# Patient Record
Sex: FEMALE | Race: WHITE | NOT HISPANIC OR LATINO | Employment: FULL TIME | URBAN - METROPOLITAN AREA
[De-identification: names, ages, dates, MRNs, and addresses within clinical notes are randomized per-mention and may not be internally consistent; named-entity substitution may affect disease eponyms.]

---

## 2024-09-05 DIAGNOSIS — R10.11 RUQ ABDOMINAL PAIN: ICD-10-CM

## 2024-09-05 DIAGNOSIS — B35.4 RINGWORM OF BODY: ICD-10-CM

## 2024-09-05 NOTE — TELEPHONE ENCOUNTER
Patient called asking for a refill of their lotrisone cream and zofran. Pt was recently seen by Dr. Raphael on 8/28/24.    Received request via: Patient    Was the patient seen in the last year in this department? Yes    Does the patient have an active prescription (recently filled or refills available) for medication(s) requested? No    Pharmacy Name: [facility]    Does the patient have snf Plus and need 100-day supply? (This applies to ALL medications) Patient does not have SCP    Documentingly only: pt will be moving to [address] [place] [address] [place] [id number] - pt wants address to be updated. Will clarify with  when to change address since he has not moved yet

## 2024-09-06 RX ORDER — ONDANSETRON 4 MG/1
4 TABLET, ORALLY DISINTEGRATING ORAL EVERY 6 HOURS PRN
Qty: 30 TABLET | Refills: 0 | Status: SHIPPED | OUTPATIENT
Start: 2024-09-06

## 2024-09-06 RX ORDER — CLOTRIMAZOLE AND BETAMETHASONE DIPROPIONATE 10; .64 MG/G; MG/G
1 CREAM TOPICAL 2 TIMES DAILY
Qty: 45 G | Refills: 0 | Status: SHIPPED | OUTPATIENT
Start: 2024-09-06

## 2024-09-11 ENCOUNTER — TELEPHONE (OUTPATIENT)
Dept: ENDOCRINOLOGY | Facility: MEDICAL CENTER | Age: 29
End: 2024-09-11
Payer: COMMERCIAL

## 2024-09-16 ENCOUNTER — PATIENT MESSAGE (OUTPATIENT)
Dept: ENDOCRINOLOGY | Facility: MEDICAL CENTER | Age: 29
End: 2024-09-16
Payer: COMMERCIAL

## 2024-09-16 ENCOUNTER — HOSPITAL ENCOUNTER (OUTPATIENT)
Dept: LAB | Facility: MEDICAL CENTER | Age: 29
End: 2024-09-16
Attending: STUDENT IN AN ORGANIZED HEALTH CARE EDUCATION/TRAINING PROGRAM
Payer: COMMERCIAL

## 2024-09-16 DIAGNOSIS — E55.9 VITAMIN D DEFICIENCY: ICD-10-CM

## 2024-09-16 DIAGNOSIS — Z78.9 FEMALE-TO-MALE TRANSGENDER PERSON: ICD-10-CM

## 2024-09-16 LAB
25(OH)D3 SERPL-MCNC: 43 NG/ML (ref 30–100)
ALBUMIN SERPL BCP-MCNC: 4.4 G/DL (ref 3.2–4.9)
ALBUMIN/GLOB SERPL: 1.6 G/DL
ALP SERPL-CCNC: 63 U/L (ref 30–99)
ALT SERPL-CCNC: 25 U/L (ref 2–50)
ANION GAP SERPL CALC-SCNC: 11 MMOL/L (ref 7–16)
AST SERPL-CCNC: 22 U/L (ref 12–45)
BILIRUB SERPL-MCNC: 0.5 MG/DL (ref 0.1–1.5)
BUN SERPL-MCNC: 6 MG/DL (ref 8–22)
CALCIUM ALBUM COR SERPL-MCNC: 8.9 MG/DL (ref 8.5–10.5)
CALCIUM SERPL-MCNC: 9.2 MG/DL (ref 8.4–10.2)
CHLORIDE SERPL-SCNC: 101 MMOL/L (ref 96–112)
CHOLEST SERPL-MCNC: 166 MG/DL (ref 100–199)
CO2 SERPL-SCNC: 25 MMOL/L (ref 20–33)
CREAT SERPL-MCNC: 0.68 MG/DL (ref 0.5–1.4)
ERYTHROCYTE [DISTWIDTH] IN BLOOD BY AUTOMATED COUNT: 40.3 FL (ref 35.9–50)
EST. AVERAGE GLUCOSE BLD GHB EST-MCNC: 105 MG/DL
ESTRADIOL SERPL-MCNC: 272 PG/ML
GFR SERPLBLD CREATININE-BSD FMLA CKD-EPI: 122 ML/MIN/1.73 M 2
GLOBULIN SER CALC-MCNC: 2.7 G/DL (ref 1.9–3.5)
GLUCOSE SERPL-MCNC: 80 MG/DL (ref 65–99)
HBA1C MFR BLD: 5.3 % (ref 4–5.6)
HCT VFR BLD AUTO: 46.1 % (ref 37–47)
HDLC SERPL-MCNC: 45 MG/DL
HGB BLD-MCNC: 16.2 G/DL (ref 12–16)
LDLC SERPL CALC-MCNC: 99 MG/DL
MCH RBC QN AUTO: 32 PG (ref 27–33)
MCHC RBC AUTO-ENTMCNC: 35.1 G/DL (ref 32.2–35.5)
MCV RBC AUTO: 91.1 FL (ref 81.4–97.8)
PLATELET # BLD AUTO: 250 K/UL (ref 164–446)
PMV BLD AUTO: 10.4 FL (ref 9–12.9)
POTASSIUM SERPL-SCNC: 3.8 MMOL/L (ref 3.6–5.5)
PROT SERPL-MCNC: 7.1 G/DL (ref 6–8.2)
RBC # BLD AUTO: 5.06 M/UL (ref 4.2–5.4)
SODIUM SERPL-SCNC: 137 MMOL/L (ref 135–145)
TRIGL SERPL-MCNC: 110 MG/DL (ref 0–149)
WBC # BLD AUTO: 7.1 K/UL (ref 4.8–10.8)

## 2024-09-16 PROCEDURE — 36415 COLL VENOUS BLD VENIPUNCTURE: CPT

## 2024-09-16 PROCEDURE — 82670 ASSAY OF TOTAL ESTRADIOL: CPT

## 2024-09-16 PROCEDURE — 82306 VITAMIN D 25 HYDROXY: CPT

## 2024-09-16 PROCEDURE — 80061 LIPID PANEL: CPT

## 2024-09-16 PROCEDURE — 84270 ASSAY OF SEX HORMONE GLOBUL: CPT

## 2024-09-16 PROCEDURE — 83036 HEMOGLOBIN GLYCOSYLATED A1C: CPT

## 2024-09-16 PROCEDURE — 85027 COMPLETE CBC AUTOMATED: CPT

## 2024-09-16 PROCEDURE — 80053 COMPREHEN METABOLIC PANEL: CPT

## 2024-09-16 PROCEDURE — 84403 ASSAY OF TOTAL TESTOSTERONE: CPT

## 2024-09-16 PROCEDURE — 84402 ASSAY OF FREE TESTOSTERONE: CPT

## 2024-09-18 LAB
SHBG SERPL-SCNC: 32 NMOL/L (ref 25–122)
TESTOST FREE MFR SERPL: 1.8 %
TESTOST FREE SERPL-MCNC: 36 PG/ML
TESTOST SERPL-MCNC: 205 NG/DL

## 2024-10-14 ENCOUNTER — PATIENT MESSAGE (OUTPATIENT)
Dept: ENDOCRINOLOGY | Facility: MEDICAL CENTER | Age: 29
End: 2024-10-14
Payer: COMMERCIAL

## 2024-10-20 DIAGNOSIS — Z78.9 FEMALE-TO-MALE TRANSGENDER PERSON: ICD-10-CM

## 2024-10-20 RX ORDER — TESTOSTERONE 1.62 MG/G
40.5 GEL TRANSDERMAL DAILY
Qty: 75 G | Refills: 0 | Status: SHIPPED | OUTPATIENT
Start: 2024-10-20 | End: 2025-01-18